# Patient Record
Sex: MALE | Race: WHITE | NOT HISPANIC OR LATINO | URBAN - METROPOLITAN AREA
[De-identification: names, ages, dates, MRNs, and addresses within clinical notes are randomized per-mention and may not be internally consistent; named-entity substitution may affect disease eponyms.]

---

## 2021-12-29 ENCOUNTER — OUTPATIENT (OUTPATIENT)
Dept: OUTPATIENT SERVICES | Facility: HOSPITAL | Age: 41
LOS: 1 days | End: 2021-12-29
Payer: COMMERCIAL

## 2021-12-29 DIAGNOSIS — Z20.828 CONTACT WITH AND (SUSPECTED) EXPOSURE TO OTHER VIRAL COMMUNICABLE DISEASES: ICD-10-CM

## 2021-12-29 LAB — SARS-COV-2 RNA SPEC QL NAA+PROBE: SIGNIFICANT CHANGE UP

## 2021-12-29 PROCEDURE — C9803: CPT

## 2021-12-29 PROCEDURE — 87635 SARS-COV-2 COVID-19 AMP PRB: CPT

## 2021-12-30 DIAGNOSIS — Z20.828 CONTACT WITH AND (SUSPECTED) EXPOSURE TO OTHER VIRAL COMMUNICABLE DISEASES: ICD-10-CM

## 2023-05-09 ENCOUNTER — INPATIENT (INPATIENT)
Facility: HOSPITAL | Age: 43
LOS: 1 days | Discharge: HOME | DRG: 603 | End: 2023-05-11
Attending: INTERNAL MEDICINE | Admitting: INTERNAL MEDICINE
Payer: COMMERCIAL

## 2023-05-09 VITALS — HEIGHT: 76 IN | WEIGHT: 250 LBS

## 2023-05-09 DIAGNOSIS — I89.1 LYMPHANGITIS: ICD-10-CM

## 2023-05-09 LAB
ALBUMIN SERPL ELPH-MCNC: 3.6 G/DL — SIGNIFICANT CHANGE UP (ref 3.3–5)
ALP SERPL-CCNC: 84 U/L — SIGNIFICANT CHANGE UP (ref 40–120)
ALT FLD-CCNC: 18 U/L — SIGNIFICANT CHANGE UP (ref 12–78)
ANION GAP SERPL CALC-SCNC: 6 MMOL/L — SIGNIFICANT CHANGE UP (ref 5–17)
APPEARANCE UR: ABNORMAL
APTT BLD: 32.6 SEC — SIGNIFICANT CHANGE UP (ref 27.5–35.5)
AST SERPL-CCNC: 15 U/L — SIGNIFICANT CHANGE UP (ref 15–37)
B BURGDOR C6 AB SER-ACNC: NEGATIVE — SIGNIFICANT CHANGE UP
B BURGDOR IGG+IGM SER QL IB: SIGNIFICANT CHANGE UP
B BURGDOR IGG+IGM SER-ACNC: 0.45 INDEX — SIGNIFICANT CHANGE UP (ref 0.01–0.89)
BABESIA MICROTI PCR, BLD RESULT: SIGNIFICANT CHANGE UP
BACTERIA # UR AUTO: ABNORMAL
BASOPHILS # BLD AUTO: 0.03 K/UL — SIGNIFICANT CHANGE UP (ref 0–0.2)
BASOPHILS NFR BLD AUTO: 0.2 % — SIGNIFICANT CHANGE UP (ref 0–2)
BILIRUB SERPL-MCNC: 1.1 MG/DL — SIGNIFICANT CHANGE UP (ref 0.2–1.2)
BILIRUB UR-MCNC: ABNORMAL
BUN SERPL-MCNC: 15 MG/DL — SIGNIFICANT CHANGE UP (ref 7–23)
CALCIUM SERPL-MCNC: 9.1 MG/DL — SIGNIFICANT CHANGE UP (ref 8.5–10.1)
CHLORIDE SERPL-SCNC: 102 MMOL/L — SIGNIFICANT CHANGE UP (ref 96–108)
CO2 SERPL-SCNC: 27 MMOL/L — SIGNIFICANT CHANGE UP (ref 22–31)
COLOR SPEC: YELLOW — SIGNIFICANT CHANGE UP
COMMENT - URINE: SIGNIFICANT CHANGE UP
CREAT SERPL-MCNC: 1.33 MG/DL — HIGH (ref 0.5–1.3)
CRP SERPL-MCNC: 139 MG/L — HIGH
DIFF PNL FLD: ABNORMAL
EGFR: 68 ML/MIN/1.73M2 — SIGNIFICANT CHANGE UP
EOSINOPHIL # BLD AUTO: 0 K/UL — SIGNIFICANT CHANGE UP (ref 0–0.5)
EOSINOPHIL NFR BLD AUTO: 0 % — SIGNIFICANT CHANGE UP (ref 0–6)
EPI CELLS # UR: NEGATIVE — SIGNIFICANT CHANGE UP
ERYTHROCYTE [SEDIMENTATION RATE] IN BLOOD: 56 MM/HR — HIGH (ref 0–15)
GLUCOSE SERPL-MCNC: 112 MG/DL — HIGH (ref 70–99)
GLUCOSE UR QL: NEGATIVE — SIGNIFICANT CHANGE UP
HCT VFR BLD CALC: 42.9 % — SIGNIFICANT CHANGE UP (ref 39–50)
HGB BLD-MCNC: 14.9 G/DL — SIGNIFICANT CHANGE UP (ref 13–17)
HYALINE CASTS # UR AUTO: ABNORMAL /LPF
IMM GRANULOCYTES NFR BLD AUTO: 0.8 % — SIGNIFICANT CHANGE UP (ref 0–0.9)
INR BLD: 1.23 RATIO — HIGH (ref 0.88–1.16)
KETONES UR-MCNC: NEGATIVE — SIGNIFICANT CHANGE UP
LACTATE SERPL-SCNC: 0.7 MMOL/L — SIGNIFICANT CHANGE UP (ref 0.7–2)
LACTATE SERPL-SCNC: 2.2 MMOL/L — HIGH (ref 0.7–2)
LEUKOCYTE ESTERASE UR-ACNC: ABNORMAL
LYMPHOCYTES # BLD AUTO: 1.16 K/UL — SIGNIFICANT CHANGE UP (ref 1–3.3)
LYMPHOCYTES # BLD AUTO: 8.5 % — LOW (ref 13–44)
MCHC RBC-ENTMCNC: 30.3 PG — SIGNIFICANT CHANGE UP (ref 27–34)
MCHC RBC-ENTMCNC: 34.7 GM/DL — SIGNIFICANT CHANGE UP (ref 32–36)
MCV RBC AUTO: 87.4 FL — SIGNIFICANT CHANGE UP (ref 80–100)
MONOCYTES # BLD AUTO: 1.03 K/UL — HIGH (ref 0–0.9)
MONOCYTES NFR BLD AUTO: 7.6 % — SIGNIFICANT CHANGE UP (ref 2–14)
NEUTROPHILS # BLD AUTO: 11.27 K/UL — HIGH (ref 1.8–7.4)
NEUTROPHILS NFR BLD AUTO: 82.9 % — HIGH (ref 43–77)
NITRITE UR-MCNC: NEGATIVE — SIGNIFICANT CHANGE UP
PH UR: 5 — SIGNIFICANT CHANGE UP (ref 5–8)
PLATELET # BLD AUTO: 259 K/UL — SIGNIFICANT CHANGE UP (ref 150–400)
POTASSIUM SERPL-MCNC: 4.3 MMOL/L — SIGNIFICANT CHANGE UP (ref 3.5–5.3)
POTASSIUM SERPL-SCNC: 4.3 MMOL/L — SIGNIFICANT CHANGE UP (ref 3.5–5.3)
PROT SERPL-MCNC: 8.3 GM/DL — SIGNIFICANT CHANGE UP (ref 6–8.3)
PROT UR-MCNC: SIGNIFICANT CHANGE UP MG/DL
PROTHROM AB SERPL-ACNC: 14.3 SEC — HIGH (ref 10.5–13.4)
RAPID RVP RESULT: SIGNIFICANT CHANGE UP
RBC # BLD: 4.91 M/UL — SIGNIFICANT CHANGE UP (ref 4.2–5.8)
RBC # FLD: 12.1 % — SIGNIFICANT CHANGE UP (ref 10.3–14.5)
RBC CASTS # UR COMP ASSIST: SIGNIFICANT CHANGE UP /HPF (ref 0–4)
SARS-COV-2 RNA SPEC QL NAA+PROBE: SIGNIFICANT CHANGE UP
SODIUM SERPL-SCNC: 135 MMOL/L — SIGNIFICANT CHANGE UP (ref 135–145)
SP GR SPEC: 1.02 — SIGNIFICANT CHANGE UP (ref 1.01–1.02)
UROBILINOGEN FLD QL: 1
WBC # BLD: 13.6 K/UL — HIGH (ref 3.8–10.5)
WBC # FLD AUTO: 13.6 K/UL — HIGH (ref 3.8–10.5)
WBC UR QL: SIGNIFICANT CHANGE UP /HPF (ref 0–5)

## 2023-05-09 PROCEDURE — 80202 ASSAY OF VANCOMYCIN: CPT

## 2023-05-09 PROCEDURE — 73590 X-RAY EXAM OF LOWER LEG: CPT | Mod: 26,LT

## 2023-05-09 PROCEDURE — 99285 EMERGENCY DEPT VISIT HI MDM: CPT

## 2023-05-09 PROCEDURE — 85027 COMPLETE CBC AUTOMATED: CPT

## 2023-05-09 PROCEDURE — 71045 X-RAY EXAM CHEST 1 VIEW: CPT | Mod: 26

## 2023-05-09 PROCEDURE — 93971 EXTREMITY STUDY: CPT | Mod: LT

## 2023-05-09 PROCEDURE — 83605 ASSAY OF LACTIC ACID: CPT

## 2023-05-09 PROCEDURE — 80048 BASIC METABOLIC PNL TOTAL CA: CPT

## 2023-05-09 PROCEDURE — 99223 1ST HOSP IP/OBS HIGH 75: CPT

## 2023-05-09 PROCEDURE — 36415 COLL VENOUS BLD VENIPUNCTURE: CPT

## 2023-05-09 RX ORDER — VANCOMYCIN HCL 1 G
VIAL (EA) INTRAVENOUS
Refills: 0 | Status: COMPLETED | OUTPATIENT
Start: 2023-05-09 | End: 2023-05-10

## 2023-05-09 RX ORDER — LANOLIN ALCOHOL/MO/W.PET/CERES
3 CREAM (GRAM) TOPICAL AT BEDTIME
Refills: 0 | Status: DISCONTINUED | OUTPATIENT
Start: 2023-05-09 | End: 2023-05-11

## 2023-05-09 RX ORDER — ONDANSETRON 8 MG/1
4 TABLET, FILM COATED ORAL EVERY 8 HOURS
Refills: 0 | Status: DISCONTINUED | OUTPATIENT
Start: 2023-05-09 | End: 2023-05-11

## 2023-05-09 RX ORDER — VANCOMYCIN HCL 1 G
1750 VIAL (EA) INTRAVENOUS EVERY 12 HOURS
Refills: 0 | Status: COMPLETED | OUTPATIENT
Start: 2023-05-09 | End: 2023-05-09

## 2023-05-09 RX ORDER — CEFTRIAXONE 500 MG/1
1000 INJECTION, POWDER, FOR SOLUTION INTRAMUSCULAR; INTRAVENOUS ONCE
Refills: 0 | Status: DISCONTINUED | OUTPATIENT
Start: 2023-05-09 | End: 2023-05-09

## 2023-05-09 RX ORDER — ACETAMINOPHEN 500 MG
650 TABLET ORAL EVERY 6 HOURS
Refills: 0 | Status: DISCONTINUED | OUTPATIENT
Start: 2023-05-09 | End: 2023-05-11

## 2023-05-09 RX ORDER — CEFTRIAXONE 500 MG/1
1000 INJECTION, POWDER, FOR SOLUTION INTRAMUSCULAR; INTRAVENOUS EVERY 24 HOURS
Refills: 0 | Status: DISCONTINUED | OUTPATIENT
Start: 2023-05-10 | End: 2023-05-10

## 2023-05-09 RX ORDER — VANCOMYCIN HCL 1 G
1750 VIAL (EA) INTRAVENOUS ONCE
Refills: 0 | Status: COMPLETED | OUTPATIENT
Start: 2023-05-09 | End: 2023-05-09

## 2023-05-09 RX ORDER — SEMAGLUTIDE 0.68 MG/ML
0.25 INJECTION, SOLUTION SUBCUTANEOUS
Refills: 0 | DISCHARGE

## 2023-05-09 RX ORDER — SODIUM CHLORIDE 9 MG/ML
2700 INJECTION INTRAMUSCULAR; INTRAVENOUS; SUBCUTANEOUS ONCE
Refills: 0 | Status: COMPLETED | OUTPATIENT
Start: 2023-05-09 | End: 2023-05-09

## 2023-05-09 RX ADMIN — SODIUM CHLORIDE 2700 MILLILITER(S): 9 INJECTION INTRAMUSCULAR; INTRAVENOUS; SUBCUTANEOUS at 11:34

## 2023-05-09 RX ADMIN — Medication 250 MILLIGRAM(S): at 23:44

## 2023-05-09 RX ADMIN — Medication 250 MILLIGRAM(S): at 12:20

## 2023-05-09 RX ADMIN — CEFTRIAXONE 1000 MILLIGRAM(S): 500 INJECTION, POWDER, FOR SOLUTION INTRAMUSCULAR; INTRAVENOUS at 23:43

## 2023-05-09 RX ADMIN — Medication 650 MILLIGRAM(S): at 18:14

## 2023-05-09 RX ADMIN — Medication 650 MILLIGRAM(S): at 17:19

## 2023-05-09 NOTE — ED STATDOCS - CARE PLAN
Principal Discharge DX:	Lymphangitis of lower extremity  Secondary Diagnosis:	Cellulitis with lymphangitis   1

## 2023-05-09 NOTE — ED STATDOCS - CONSIDERATION OF ADMISSION OBSERVATION
Consideration of Admission/Observation Pt w/ systemic symptoms of chills, rigors at home, also with lymphangitic spread of infection on exam. Pt will need IV abx.  Plan: admit

## 2023-05-09 NOTE — ED ADULT TRIAGE NOTE - CHIEF COMPLAINT QUOTE
Pt presented to the ER with c/o left leg infection. Pt stated that he noticed that it started Sunday around his ankle and has been spreading up his leg. Pt went to urgent care who referred him to come the ER. Pt stated that it hurts to bear weight. Pt reported a fever, nausea, and headache.

## 2023-05-09 NOTE — PATIENT PROFILE ADULT - FALL HARM RISK - HARM RISK INTERVENTIONS

## 2023-05-09 NOTE — H&P ADULT - NSHPPHYSICALEXAM_GEN_ALL_CORE
Vital Signs Last 24 Hrs  T(C): 37.8 (09 May 2023 09:35), Max: 37.8 (09 May 2023 09:35)  T(F): 100 (09 May 2023 09:35), Max: 100 (09 May 2023 09:35)  HR: 100 (09 May 2023 09:35) (100 - 100)  BP: 108/62 (09 May 2023 09:35) (108/62 - 108/62)  BP(mean): 78 (09 May 2023 09:35) (78 - 78)  RR: 18 (09 May 2023 09:35) (18 - 18)  SpO2: 97% (09 May 2023 09:35) (97% - 97%)    Parameters below as of 09 May 2023 09:35  Patient On (Oxygen Delivery Method): room air    PHYSICAL EXAM:      Constitutional: NAD  Eyes: perrl, no conjunctival changes  ENMT: no exudates, moist oral muc, uvula midline  Neck: no JVD, no LAD  Back: no cva tenderness  Respiratory: CTA, no exp wheezes  Cardiovascular: S1S2 reg, no murmur gallop or rub  Gastrointestinal: abd soft, NT/ND + BS  Genitourinary: voiding  Extremities: LLE marked edema and erythema, lymphangitic spread to inguinal area, tender to palpation   Vascular: pedal pulses + bilateral, warm extremities  Neurological: non focal, mot str 5/5/ all extr

## 2023-05-09 NOTE — H&P ADULT - HISTORY OF PRESENT ILLNESS
Statement: 42 y/o male w/ no pertinent PMHx presents to the ED SIB  c/o redness on his L shin with associated chills. Pt tested -COVID/-Flu at  today. Pt endorses red streak starting from the area of redness and tracking up his left leg that is new. Pt states he was told he has an infection, endorses a wound on his heel that he thinks started from new shoes or a possible spider bite. He has lymphangitic spread to inguinal area. Adm for severe cellulitis. He is also c/o chills and poor appetite r/o bacteremia.

## 2023-05-09 NOTE — ED STATDOCS - SKIN, MLM
+LLE distal third of LE, streaking redness on upper third of LLE, old injury on heel +LLE distal third of LE, streaking redness on upper third of LLE, old abrasion heel scabbed over and no redness

## 2023-05-09 NOTE — H&P ADULT - ASSESSMENT
* Severe cellulitis LLE  IVF  IV Ceftriaxone +/- Vanco- ID consult  prn analgesia  blood cx sent      Plan LMWH after BMP available for DVT px

## 2023-05-09 NOTE — ED ADULT NURSE NOTE - CAS EDN DISCHARGE INTERVENTIONS
----- Message from Mignon Montoya sent at 7/17/2017  8:32 AM CDT -----  Contact: Emma Vela mom [phone]  Needs rx refill adderall xr 15 mg, [facility] on [place], Dr Varghese writes the child's rx  
IV intact/Admission wristband placed

## 2023-05-09 NOTE — ED STATDOCS - OBJECTIVE STATEMENT
42 y/o male w/ no pertinent PMHx presents to the ED SIB  c/o redness on his L shin with associated chills. Pt tested -COVID/-Flu at  today. Pt endorses red streak starting from the area of redness and tracking up his left leg that is new. Pt states he was told he has an infection, endorses a wound on his heel that he thinks started from new shoes or a possible spider bite. Pt is allergic to ASA.

## 2023-05-09 NOTE — ED STATDOCS - PROGRESS NOTE DETAILS
42 y/o Male with no PMHx presents to the ED c/o increasing redness and swelling to left leg.  Associated with chills, rigors at home.  Went to  this morning and was referred to ED for further treatment with IV abx.  On exam, left lower leg with erythema to ankle and shin area with streaking extending toward knee.  Will f/u xray to r.o Gas producing infection.  Plan to admit to hospital.  Discussed case with Dr. Osorio for admission.  Cindy Chaves PA-C

## 2023-05-09 NOTE — ED STATDOCS - ATTENDING APP SHARED VISIT CONTRIBUTION OF CARE
I, John Garner MD,  performed the initial face to face bedside interview with this patient regarding history of present illness, review of symptoms and relevant past medical, social and family history.  I completed an independent physical examination.  I was the initial provider who evaluated this patient.   I personally saw the patient and performed a substantive portion of the visit including all aspects of the medical decision making.  I have signed out the follow up of any pending tests (i.e. labs, radiological studies) to the WILLIAM.  I have communicated the patient’s plan of care and disposition with the WILLIAM.  The history, relevant review of systems, past medical and surgical history, medical decision making, and physical examination was documented by the scribe in my presence and I attest to the accuracy of the documentation.

## 2023-05-09 NOTE — PATIENT PROFILE ADULT - FALL HARM RISK - FACTORS NURSING JUDGEMENT
Requested Prescriptions     Pending Prescriptions Disp Refills    apixaban (ELIQUIS) 5 mg tablet 180 Tab 3     Sig: Take 1 Tab by mouth two (2) times a day.  atorvastatin (LIPITOR) 20 mg tablet 90 Tab 3     Sig: Take 1 Tab by mouth daily.  amiodarone (CORDARONE) 200 mg tablet 90 Tab 3     Sig: Take 1 Tab by mouth daily. Yes

## 2023-05-10 LAB
ANION GAP SERPL CALC-SCNC: 5 MMOL/L — SIGNIFICANT CHANGE UP (ref 5–17)
BUN SERPL-MCNC: 9 MG/DL — SIGNIFICANT CHANGE UP (ref 7–23)
CALCIUM SERPL-MCNC: 8.4 MG/DL — LOW (ref 8.5–10.1)
CHLORIDE SERPL-SCNC: 106 MMOL/L — SIGNIFICANT CHANGE UP (ref 96–108)
CO2 SERPL-SCNC: 24 MMOL/L — SIGNIFICANT CHANGE UP (ref 22–31)
CREAT SERPL-MCNC: 0.9 MG/DL — SIGNIFICANT CHANGE UP (ref 0.5–1.3)
CULTURE RESULTS: SIGNIFICANT CHANGE UP
EGFR: 109 ML/MIN/1.73M2 — SIGNIFICANT CHANGE UP
GLUCOSE SERPL-MCNC: 107 MG/DL — HIGH (ref 70–99)
HCT VFR BLD CALC: 36.7 % — LOW (ref 39–50)
HGB BLD-MCNC: 12.7 G/DL — LOW (ref 13–17)
MCHC RBC-ENTMCNC: 30.2 PG — SIGNIFICANT CHANGE UP (ref 27–34)
MCHC RBC-ENTMCNC: 34.6 GM/DL — SIGNIFICANT CHANGE UP (ref 32–36)
MCV RBC AUTO: 87.4 FL — SIGNIFICANT CHANGE UP (ref 80–100)
PLATELET # BLD AUTO: 218 K/UL — SIGNIFICANT CHANGE UP (ref 150–400)
POTASSIUM SERPL-MCNC: 4.2 MMOL/L — SIGNIFICANT CHANGE UP (ref 3.5–5.3)
POTASSIUM SERPL-SCNC: 4.2 MMOL/L — SIGNIFICANT CHANGE UP (ref 3.5–5.3)
RBC # BLD: 4.2 M/UL — SIGNIFICANT CHANGE UP (ref 4.2–5.8)
RBC # FLD: 12.2 % — SIGNIFICANT CHANGE UP (ref 10.3–14.5)
SODIUM SERPL-SCNC: 135 MMOL/L — SIGNIFICANT CHANGE UP (ref 135–145)
SPECIMEN SOURCE: SIGNIFICANT CHANGE UP
WBC # BLD: 9.02 K/UL — SIGNIFICANT CHANGE UP (ref 3.8–10.5)
WBC # FLD AUTO: 9.02 K/UL — SIGNIFICANT CHANGE UP (ref 3.8–10.5)

## 2023-05-10 PROCEDURE — 93971 EXTREMITY STUDY: CPT | Mod: 26,LT

## 2023-05-10 PROCEDURE — 99233 SBSQ HOSP IP/OBS HIGH 50: CPT

## 2023-05-10 RX ORDER — CEFEPIME 1 G/1
INJECTION, POWDER, FOR SOLUTION INTRAMUSCULAR; INTRAVENOUS
Refills: 0 | Status: DISCONTINUED | OUTPATIENT
Start: 2023-05-10 | End: 2023-05-11

## 2023-05-10 RX ORDER — CEFEPIME 1 G/1
INJECTION, POWDER, FOR SOLUTION INTRAMUSCULAR; INTRAVENOUS
Refills: 0 | Status: DISCONTINUED | OUTPATIENT
Start: 2023-05-10 | End: 2023-05-10

## 2023-05-10 RX ORDER — CEFEPIME 1 G/1
2000 INJECTION, POWDER, FOR SOLUTION INTRAMUSCULAR; INTRAVENOUS EVERY 12 HOURS
Refills: 0 | Status: DISCONTINUED | OUTPATIENT
Start: 2023-05-10 | End: 2023-05-11

## 2023-05-10 RX ORDER — VANCOMYCIN HCL 1 G
1500 VIAL (EA) INTRAVENOUS EVERY 12 HOURS
Refills: 0 | Status: DISCONTINUED | OUTPATIENT
Start: 2023-05-10 | End: 2023-05-11

## 2023-05-10 RX ORDER — CEFEPIME 1 G/1
2000 INJECTION, POWDER, FOR SOLUTION INTRAMUSCULAR; INTRAVENOUS ONCE
Refills: 0 | Status: COMPLETED | OUTPATIENT
Start: 2023-05-10 | End: 2023-05-10

## 2023-05-10 RX ORDER — ENOXAPARIN SODIUM 100 MG/ML
40 INJECTION SUBCUTANEOUS EVERY 24 HOURS
Refills: 0 | Status: DISCONTINUED | OUTPATIENT
Start: 2023-05-10 | End: 2023-05-11

## 2023-05-10 RX ADMIN — ENOXAPARIN SODIUM 40 MILLIGRAM(S): 100 INJECTION SUBCUTANEOUS at 14:48

## 2023-05-10 RX ADMIN — CEFEPIME 100 MILLIGRAM(S): 1 INJECTION, POWDER, FOR SOLUTION INTRAMUSCULAR; INTRAVENOUS at 14:48

## 2023-05-10 RX ADMIN — Medication 300 MILLIGRAM(S): at 11:57

## 2023-05-10 RX ADMIN — Medication 650 MILLIGRAM(S): at 19:20

## 2023-05-10 RX ADMIN — Medication 650 MILLIGRAM(S): at 18:51

## 2023-05-10 RX ADMIN — Medication 300 MILLIGRAM(S): at 21:50

## 2023-05-10 NOTE — PROGRESS NOTE ADULT - SUBJECTIVE AND OBJECTIVE BOX
44 y/o male w/ no pertinent PMHx admitted for cellulitis     Pt reports he's able to bear weight on his leg today. Lateral aspect of the ankle looks better per patient.   Anxious about blood culture results and plan of care.   All questions answered.     Review of Systems:  Other Review of Systems: All other review of systems negative, except as noted in HPI      Allergies and Intolerances:        Allergies:  	aspirin: Drug, Hives  	Tree Nuts: Food, Hives        Patient History:   Past Medical, Past Surgical, and Family History:  PAST MEDICAL HISTORY:  No pertinent past medical history.    MEDICATIONS  (STANDING):  cefepime  Injectable.      vancomycin  IVPB 1500 milliGRAM(s) IV Intermittent every 12 hours    MEDICATIONS  (PRN):  acetaminophen     Tablet .. 650 milliGRAM(s) Oral every 6 hours PRN Temp greater or equal to 38C (100.4F), Mild Pain (1 - 3)  aluminum hydroxide/magnesium hydroxide/simethicone Suspension 30 milliLiter(s) Oral every 4 hours PRN Dyspepsia  melatonin 3 milliGRAM(s) Oral at bedtime PRN Insomnia  ondansetron Injectable 4 milliGRAM(s) IV Push every 8 hours PRN Nausea and/or Vomiting        Physical Exam:   Vital Signs Last 24 Hrs  T(C): 36.8 (10 May 2023 08:33), Max: 38 (09 May 2023 17:18)  T(F): 98.2 (10 May 2023 08:33), Max: 100.4 (09 May 2023 17:18)  HR: 86 (10 May 2023 08:33) (85 - 97)  BP: 119/83 (10 May 2023 08:33) (112/71 - 137/72)  BP(mean): 87 (09 May 2023 21:40) (87 - 87)  RR: 18 (10 May 2023 08:33) (15 - 18)  SpO2: 97% (10 May 2023 08:33) (97% - 100%)    Parameters below as of 10 May 2023 08:33  Patient On (Oxygen Delivery Method): room air        PHYSICAL EXAM:    Constitutional: NAD  Eyes: perrl, no conjunctival changes  ENMT: no exudates, moist oral muc, uvula midline  Neck: no JVD, no LAD  Back: no cva tenderness  Respiratory: CTA, no exp wheezes  Cardiovascular: S1S2 reg, no murmur gallop or rub  Gastrointestinal: abd soft, NT/ND + BS  Genitourinary: voiding  Extremities: LLE marked edema and erythema, lymphangitic spread to inguinal area, minimal tenderness to palpation  Vascular: pedal pulses + bilateral, warm extremities  Neurological: non focal, mot str 5/5/ all extr      Labs and Results:    CBC Full  -  ( 10 May 2023 06:44 )  WBC Count : 9.02 K/uL  RBC Count : 4.20 M/uL  Hemoglobin : 12.7 g/dL  Hematocrit : 36.7 %  Platelet Count - Automated : 218 K/uL  Mean Cell Volume : 87.4 fl  Mean Cell Hemoglobin : 30.2 pg  Mean Cell Hemoglobin Concentration : 34.6 gm/dL  Auto Neutrophil # : x  Auto Lymphocyte # : x  Auto Monocyte # : x  Auto Eosinophil # : x  Auto Basophil # : x  Auto Neutrophil % : x  Auto Lymphocyte % : x  Auto Monocyte % : x  Auto Eosinophil % : x  Auto Basophil % : x    05-10    135  |  106  |  9   ----------------------------<  107<H>  4.2   |  24  |  0.90    Ca    8.4<L>      10 May 2023 06:44    TPro  8.3  /  Alb  3.6  /  TBili  1.1  /  DBili  x   /  AST  15  /  ALT  18  /  AlkPhos  84  05-09

## 2023-05-10 NOTE — CONSULT NOTE ADULT - SUBJECTIVE AND OBJECTIVE BOX
Patient is a 43y old  Male who presents with a chief complaint of LLE redness (09 May 2023 11:57)    HPI:  44 y/o male w/ no pertinent PMHx presents to the ED SIB  c/o redness on his L shin with associated chills. Pt tested -COVID/-Flu at  today. Pt endorses red streak starting from the area of redness and tracking up his left leg that is new. Pt states he was told he has an infection, endorses a wound on his heel that he thinks started from new shoes or a possible spider bite. He has lymphangitic spread to inguinal area. Adm for severe cellulitis. He is also c/o chills and poor appetite r/o bacteremia. Was given IV vancomycin/rocephin.       PMH: as above  PSH: as above  Meds: per reconciliation sheet, noted below  MEDICATIONS  (STANDING):  cefepime  Injectable.      vancomycin  IVPB 1500 milliGRAM(s) IV Intermittent every 12 hours    Allergies    Tree Nuts (Hives)  aspirin (Hives)    Intolerances      Social: no smoking, no alcohol, no illegal drugs; no recent travel, no exposure to TB  FAMILY HISTORY:     no history of premature cardiovascular disease in first degree relatives    ROS: the patient denies fever, no chills, no HA, no dizziness, no sore throat, no blurry vision, no CP, no palpitations, no SOB, no cough, no abdominal pain, no diarrhea, no N/V, no dysuria, no leg pain, no claudication, no rash, no joint aches, no rectal pain or bleeding, no night sweats + LLE pain, redness, swelling    All other systems reviewed and are negative    Vital Signs Last 24 Hrs  T(C): 36.8 (10 May 2023 08:33), Max: 38 (09 May 2023 17:18)  T(F): 98.2 (10 May 2023 08:33), Max: 100.4 (09 May 2023 17:18)  HR: 86 (10 May 2023 08:33) (85 - 97)  BP: 119/83 (10 May 2023 08:33) (112/71 - 137/72)  BP(mean): 87 (09 May 2023 21:40) (87 - 87)  RR: 18 (10 May 2023 08:33) (15 - 18)  SpO2: 97% (10 May 2023 08:33) (97% - 100%)    Parameters below as of 10 May 2023 08:33  Patient On (Oxygen Delivery Method): room air      PE:  Constitutional: NAD   HEENT: NC/AT, EOMI, PERRLA, conjunctivae clear; ears and nose atraumatic; pharynx benign  Neck: supple; thyroid not palpable  Back: no tenderness  Respiratory: respiratory effort normal; clear to auscultation  Cardiovascular: S1S2 regular, no murmurs  Abdomen: soft, not tender, not distended, positive BS; liver and spleen WNL  Genitourinary: no suprapubic tenderness  Lymphatic: no LN palpable  Musculoskeletal: no muscle tenderness, no joint swelling or tenderness  Extremities: no pedal edema  Neurological/ Psychiatric: AxOx3, Judgement and insight normal;  moving all extremities  Skin: no rashes; no palpable lesions + LLE erythema, warmth, tenderness     Labs: all available labs reviewed                        12.7   9.02  )-----------( 218      ( 10 May 2023 06:44 )             36.7     05-10    135  |  106  |  9   ----------------------------<  107<H>  4.2   |  24  |  0.90    Ca    8.4<L>      10 May 2023 06:44    TPro  8.3  /  Alb  3.6  /  TBili  1.1  /  DBili  x   /  AST  15  /  ALT  18  /  AlkPhos  84  05-09     LIVER FUNCTIONS - ( 09 May 2023 11:25 )  Alb: 3.6 g/dL / Pro: 8.3 gm/dL / ALK PHOS: 84 U/L / ALT: 18 U/L / AST: 15 U/L / GGT: x           Urinalysis Basic - ( 09 May 2023 11:25 )    Color: Yellow / Appearance: Slightly Turbid / S.025 / pH: x  Gluc: x / Ketone: Negative  / Bili: Small / Urobili: 1   Blood: x / Protein: Trace mg/dL / Nitrite: Negative   Leuk Esterase: Trace / RBC: 0-2 /HPF / WBC 3-5 /HPF   Sq Epi: x / Non Sq Epi: x / Bacteria: Few          Radiology: all available radiological tests reviewed      ACC: [id number] EXAM:  XR TIB FIB AP LAT 2 VIEWS LT   ORDERED BY: ALEXANDRO LOCK     PROCEDURE DATE:  2023          INTERPRETATION:  2 views of the left lower leg were performed for history   of infection.    There are no prior studies for comparison.    There is no evidence of fracture, dislocation or osteomyelitis. No bone   erosion or destruction is seen. No lytic or blastic bone lesions are   present. There is no sign of penetrating injury or radiopaque foreign   body. There is no soft tissue gas.    IMPRESSION:  1. No evidence of fracture, dislocation or osteomyelitis.  2. No evidence of penetrating injury, opaque foreign body or soft tissue   gas.      Advanced directives addressed: full resuscitation

## 2023-05-10 NOTE — PROGRESS NOTE ADULT - ASSESSMENT
42 y/o male w/ no pertinent PMHx admitted for cellulitis       #Severe cellulitis LLE  IVF  Leucocytosis resolved, clinically improving  Continue Vancomycin, DC rocephin --> cefepime  prn analgesia  f/u blood cx   Tylenol for  fever  discussed care with ID (Dr. Boss) and pt at bedside  all questions answered      Dvtppx: Lovenox

## 2023-05-10 NOTE — CONSULT NOTE ADULT - ASSESSMENT
42 y/o male w/ no pertinent PMHx presents to the ED SIB  c/o redness on his L shin with associated chills. Pt tested -COVID/-Flu at  today. Pt endorses red streak starting from the area of redness and tracking up his left leg that is new. Pt states he was told he has an infection, endorses a wound on his heel that he thinks started from new shoes or a possible spider bite. He has lymphangitic spread to inguinal area. Adm for severe cellulitis. He is also c/o chills and poor appetite r/o bacteremia. Was given IV vancomycin/rocephin.     1. LLE cellulitis with lymphangitis  - change rocephin to cefepime [id number]  - continue with vancomycin [id number] check trough prior to 4th dose  - f/u cultures   - monitor temps  - tolerating abx well so far; no side effects noted  - reason for abx use and side effects reviewed with patient  - supportive care  - fu cbc    2. other issues - care per medicine

## 2023-05-11 ENCOUNTER — TRANSCRIPTION ENCOUNTER (OUTPATIENT)
Age: 43
End: 2023-05-11

## 2023-05-11 VITALS
RESPIRATION RATE: 19 BRPM | SYSTOLIC BLOOD PRESSURE: 117 MMHG | HEART RATE: 75 BPM | DIASTOLIC BLOOD PRESSURE: 78 MMHG | OXYGEN SATURATION: 97 % | TEMPERATURE: 97 F

## 2023-05-11 PROBLEM — Z00.00 ENCOUNTER FOR PREVENTIVE HEALTH EXAMINATION: Status: ACTIVE | Noted: 2023-05-11

## 2023-05-11 LAB
ANION GAP SERPL CALC-SCNC: 5 MMOL/L — SIGNIFICANT CHANGE UP (ref 5–17)
BUN SERPL-MCNC: 8 MG/DL — SIGNIFICANT CHANGE UP (ref 7–23)
CALCIUM SERPL-MCNC: 8.9 MG/DL — SIGNIFICANT CHANGE UP (ref 8.5–10.1)
CHLORIDE SERPL-SCNC: 104 MMOL/L — SIGNIFICANT CHANGE UP (ref 96–108)
CO2 SERPL-SCNC: 28 MMOL/L — SIGNIFICANT CHANGE UP (ref 22–31)
CREAT SERPL-MCNC: 0.88 MG/DL — SIGNIFICANT CHANGE UP (ref 0.5–1.3)
EGFR: 110 ML/MIN/1.73M2 — SIGNIFICANT CHANGE UP
GLUCOSE SERPL-MCNC: 108 MG/DL — HIGH (ref 70–99)
HCT VFR BLD CALC: 38.1 % — LOW (ref 39–50)
HGB BLD-MCNC: 12.8 G/DL — LOW (ref 13–17)
MCHC RBC-ENTMCNC: 29.7 PG — SIGNIFICANT CHANGE UP (ref 27–34)
MCHC RBC-ENTMCNC: 33.6 GM/DL — SIGNIFICANT CHANGE UP (ref 32–36)
MCV RBC AUTO: 88.4 FL — SIGNIFICANT CHANGE UP (ref 80–100)
PLATELET # BLD AUTO: 248 K/UL — SIGNIFICANT CHANGE UP (ref 150–400)
POTASSIUM SERPL-MCNC: 4.4 MMOL/L — SIGNIFICANT CHANGE UP (ref 3.5–5.3)
POTASSIUM SERPL-SCNC: 4.4 MMOL/L — SIGNIFICANT CHANGE UP (ref 3.5–5.3)
RBC # BLD: 4.31 M/UL — SIGNIFICANT CHANGE UP (ref 4.2–5.8)
RBC # FLD: 12.1 % — SIGNIFICANT CHANGE UP (ref 10.3–14.5)
SODIUM SERPL-SCNC: 137 MMOL/L — SIGNIFICANT CHANGE UP (ref 135–145)
VANCOMYCIN TROUGH SERPL-MCNC: 11.5 UG/ML — SIGNIFICANT CHANGE UP (ref 10–20)
WBC # BLD: 9.49 K/UL — SIGNIFICANT CHANGE UP (ref 3.8–10.5)
WBC # FLD AUTO: 9.49 K/UL — SIGNIFICANT CHANGE UP (ref 3.8–10.5)

## 2023-05-11 PROCEDURE — 99239 HOSP IP/OBS DSCHRG MGMT >30: CPT

## 2023-05-11 RX ORDER — CEFUROXIME AXETIL 250 MG
1 TABLET ORAL
Qty: 20 | Refills: 0
Start: 2023-05-11 | End: 2023-05-20

## 2023-05-11 RX ADMIN — CEFEPIME 100 MILLIGRAM(S): 1 INJECTION, POWDER, FOR SOLUTION INTRAMUSCULAR; INTRAVENOUS at 02:22

## 2023-05-11 RX ADMIN — CEFEPIME 100 MILLIGRAM(S): 1 INJECTION, POWDER, FOR SOLUTION INTRAMUSCULAR; INTRAVENOUS at 14:14

## 2023-05-11 RX ADMIN — Medication 300 MILLIGRAM(S): at 09:30

## 2023-05-11 NOTE — PROGRESS NOTE ADULT - ASSESSMENT
42 y/o male w/ no pertinent PMHx presents to the ED SIB  c/o redness on his L shin with associated chills. Pt tested -COVID/-Flu at . Pt endorses red streak starting from the area of redness and tracking up his left leg that is new. Pt states he was told he has an infection, endorses a wound on his heel that he thinks started from new shoes or a possible spider bite. He has lymphangitic spread to inguinal area. Adm for severe cellulitis. He is also c/o chills and poor appetite r/o bacteremia. Was given IV vancomycin/rocephin.     1. LLE cellulitis with lymphangitis  - improving  - s/p rocephin, on cefepime [id number] #2  - on vancomycin [id number] check trough prior to 4th dose #2-3  - continue with antibiotic coverage   - blood cx no growth  - on dc - po doxycycline 100mg BID/ceftin 500mg bid x 10 days  - monitor temps  - tolerating abx well so far; no side effects noted  - reason for abx use and side effects reviewed with patient  - supportive care  - fu cbc    2. other issues - care per medicine

## 2023-05-11 NOTE — DISCHARGE NOTE PROVIDER - NSDCCPCAREPLAN_GEN_ALL_CORE_FT
PRINCIPAL DISCHARGE DIAGNOSIS  Diagnosis: Cellulitis with lymphangitis  Assessment and Plan of Treatment: Continue with oral antibiotics as prescribed .  Follow up outpatient with primary care doctor in one week.      SECONDARY DISCHARGE DIAGNOSES  Diagnosis: Cellulitis with lymphangitis  Assessment and Plan of Treatment:

## 2023-05-11 NOTE — DISCHARGE NOTE PROVIDER - HOSPITAL COURSE
Vital Signs Last 24 Hrs  T(C): 36.3 (11 May 2023 07:13), Max: 37 (10 May 2023 15:31)  T(F): 97.3 (11 May 2023 07:13), Max: 98.6 (10 May 2023 15:31)  HR: 75 (11 May 2023 07:13) (74 - 84)  BP: 117/78 (11 May 2023 07:13) (107/68 - 118/74)  BP(mean): --  RR: 19 (11 May 2023 07:13) (18 - 19)  SpO2: 97% (11 May 2023 07:13) (97% - 100%)    Parameters below as of 11 May 2023 07:13  Patient On (Oxygen Delivery Method): room air        HEENT:   pupils equal and reactive, EOMI, no oropharyngeal lesions, erythema, exudates, oral thrush    NECK:   supple, no carotid bruits, no palpable lymph nodes, no thyromegaly    CV:  +S1, +S2, regular, no murmurs or rubs    RESP:   lungs clear to auscultation bilaterally, no wheezing, rales, rhonchi, good air entry bilaterally    BREAST:  not examined    GI:  abdomen soft, non-tender, non-distended, normal BS, no bruits, no abdominal masses, no palpable masses    RECTAL:  not examined    :  not examined    MSK:   normal muscle tone, no atrophy, no rigidity, no contractions    EXT:   no clubbing, no cyanosis, no edema, no calf pain, swelling or erythema    VASCULAR:  pulses equal and symmetric in the upper and lower extremities    NEURO:  AAOX3, no focal neurological deficits, follows all commands, able to move extremities spontaneously    SKIN:  no ulcers, lesions or rashes    11 May 2023 06:06    137    |  104    |  8      ----------------------------<  108    4.4     |  28     |  0.88     Ca    8.9        11 May 2023 06:06    CBC Full  -  ( 11 May 2023 06:06 )  WBC Count : 9.49 K/uL  Hemoglobin : 12.8 g/dL  Hematocrit : 38.1 %  Platelet Count - Automated : 248 K/uL  Mean Cell Volume : 88.4 fl  Mean Cell Hemoglobin : 29.7 pg  Mean Cell Hemoglobin Concentration : 33.6 gm/dL          Hospital Course:     44 y/o male w/ no pertinent PMHx admitted for cellulitis       #Severe cellulitis LLE  IVF  Leucocytosis resolved, clinically improving  Continue Vancomycin, DC rocephin --> cefepime  on dc - po doxycycline 100mg BID/ceftin 500mg bid x 10 days      Dvtppx: Lovenox

## 2023-05-11 NOTE — PROGRESS NOTE ADULT - SUBJECTIVE AND OBJECTIVE BOX
Cheif complaints and Diagnosis: cellulitis LLE    Subjective: no complaints      REVIEW OF SYSTEMS:    CONSTITUTIONAL: No weakness, fevers or chills  EYES/ENT: No visual changes;  No vertigo or throat pain   NECK: No pain or stiffness  RESPIRATORY: No cough, wheezing, hemoptysis; No shortness of breath  CARDIOVASCULAR: No chest pain or palpitations  GASTROINTESTINAL: No abdominal or epigastric pain. No nausea, vomiting, or hematemesis; No diarrhea or constipation. No melena or hematochezia.  GENITOURINARY: No dysuria, frequency or hematuria  NEUROLOGICAL: No numbness or weakness  SKIN: No itching, burning, rashes, or lesions   All other review of systems is negative unless indicated above      Vital Signs Last 24 Hrs  T(C): 36.3 (11 May 2023 07:13), Max: 37 (10 May 2023 15:31)  T(F): 97.3 (11 May 2023 07:13), Max: 98.6 (10 May 2023 15:31)  HR: 75 (11 May 2023 07:13) (74 - 84)  BP: 117/78 (11 May 2023 07:13) (107/68 - 118/74)  BP(mean): --  RR: 19 (11 May 2023 07:13) (18 - 19)  SpO2: 97% (11 May 2023 07:13) (97% - 100%)    Parameters below as of 11 May 2023 07:13  Patient On (Oxygen Delivery Method): room air        HEENT:   pupils equal and reactive, EOMI, no oropharyngeal lesions, erythema, exudates, oral thrush    NECK:   supple, no carotid bruits, no palpable lymph nodes, no thyromegaly    CV:  +S1, +S2, regular, no murmurs or rubs    RESP:   lungs clear to auscultation bilaterally, no wheezing, rales, rhonchi, good air entry bilaterally    BREAST:  not examined    GI:  abdomen soft, non-tender, non-distended, normal BS, no bruits, no abdominal masses, no palpable masses    RECTAL:  not examined    :  not examined    MSK:   normal muscle tone, no atrophy, no rigidity, no contractions    EXT:   no clubbing, no cyanosis, no edema, no calf pain, swelling or erythema    VASCULAR:  pulses equal and symmetric in the upper and lower extremities    NEURO:  AAOX3, no focal neurological deficits, follows all commands, able to move extremities spontaneously    SKIN:  no ulcers, lesions or rashes    MEDICATIONS  (STANDING):  cefepime   IVPB 2000 milliGRAM(s) IV Intermittent every 12 hours  cefepime   IVPB      enoxaparin Injectable 40 milliGRAM(s) SubCutaneous every 24 hours  vancomycin  IVPB 1500 milliGRAM(s) IV Intermittent every 12 hours    MEDICATIONS  (PRN):  acetaminophen     Tablet .. 650 milliGRAM(s) Oral every 6 hours PRN Temp greater or equal to 38C (100.4F), Mild Pain (1 - 3)  aluminum hydroxide/magnesium hydroxide/simethicone Suspension 30 milliLiter(s) Oral every 4 hours PRN Dyspepsia  melatonin 3 milliGRAM(s) Oral at bedtime PRN Insomnia  ondansetron Injectable 4 milliGRAM(s) IV Push every 8 hours PRN Nausea and/or Vomiting      Urinalysis Basic - ( 09 May 2023 11:25 )    Color: Yellow / Appearance: Slightly Turbid / S.025 / pH: x  Gluc: x / Ketone: Negative  / Bili: Small / Urobili: 1   Blood: x / Protein: Trace mg/dL / Nitrite: Negative   Leuk Esterase: Trace / RBC: 0-2 /HPF / WBC 3-5 /HPF   Sq Epi: x / Non Sq Epi: x / Bacteria: Few    11 May 2023 06:06    137    |  104    |  8      ----------------------------<  108    4.4     |  28     |  0.88     Ca    8.9        11 May 2023 06:06    TPro  8.3    /  Alb  3.6    /  TBili  1.1    /  DBili  x      /  AST  15     /  ALT  18     /  AlkPhos  84     09 May 2023 11:25  LIVER FUNCTIONS - ( 09 May 2023 11:25 )  Alb: 3.6 g/dL / Pro: 8.3 gm/dL / ALK PHOS: 84 U/L / ALT: 18 U/L / AST: 15 U/L / GGT: x         PT/INR - ( 09 May 2023 11:25 )   PT: 14.3 sec;   INR: 1.23 ratio         PTT - ( 09 May 2023 11:25 )  PTT:32.6 secCBC Full  -  ( 11 May 2023 06:06 )  WBC Count : 9.49 K/uL  Hemoglobin : 12.8 g/dL  Hematocrit : 38.1 %  Platelet Count - Automated : 248 K/uL  Mean Cell Volume : 88.4 fl  Mean Cell Hemoglobin : 29.7 pg  Mean Cell Hemoglobin Concentration : 33.6 gm/dL  Auto Neutrophil # : x  Auto Lymphocyte # : x  Auto Monocyte # : x  Auto Eosinophil # : x  Auto Basophil # : x  Auto Neutrophil % : x  Auto Lymphocyte % : x  Auto Monocyte % : x  Auto Eosinophil % : x  Auto Basophil % : x            PT/INR - ( 09 May 2023 11:25 )   PT: 14.3 sec;   INR: 1.23 ratio         PTT - ( 09 May 2023 11:25 )  PTT:32.6 sec          Assessment and Plan:      44 y/o male w/ no pertinent PMHx admitted for cellulitis       #Severe cellulitis LLE  IVF  Leucocytosis resolved, clinically improving  Continue Vancomycin, DC rocephin --> cefepime  prn analgesia  f/u blood cx   Tylenol for  fever  discussed care with ID (Dr. Boss) and pt at bedside  all questions answered      Dvtppx: Lovenox

## 2023-05-11 NOTE — DISCHARGE NOTE NURSING/CASE MANAGEMENT/SOCIAL WORK - PATIENT PORTAL LINK FT
You can access the FollowMyHealth Patient Portal offered by [facility] by registering at the following website: [email or web address]. By joining [facility]’s FollowMyHealth portal, you will also be able to view your health information using other applications (apps) compatible with our system.

## 2023-05-11 NOTE — PROGRESS NOTE ADULT - SUBJECTIVE AND OBJECTIVE BOX
Date of service: 23 @ 10:31    pt seen and examined  feels better   less L leg tenderness, redness and swelling are down  no fevers    ROS: no fever or chills; denies dizziness, no HA, no SOB or cough, no abdominal pain, no diarrhea or constipation; no dysuria, no urinary frequency, no legs pain, no rashes    MEDICATIONS  (STANDING):  cefepime   IVPB 2000 milliGRAM(s) IV Intermittent every 12 hours  cefepime   IVPB      enoxaparin Injectable 40 milliGRAM(s) SubCutaneous every 24 hours  vancomycin  IVPB 1500 milliGRAM(s) IV Intermittent every 12 hours      Vital Signs Last 24 Hrs  T(C): 36.3 (11 May 2023 07:13), Max: 37 (10 May 2023 15:31)  T(F): 97.3 (11 May 2023 07:13), Max: 98.6 (10 May 2023 15:31)  HR: 75 (11 May 2023 07:13) (74 - 84)  BP: 117/78 (11 May 2023 07:13) (107/68 - 118/74)  BP(mean): --  RR: 19 (11 May 2023 07:13) (18 - 19)  SpO2: 97% (11 May 2023 07:13) (97% - 100%)    Parameters below as of 11 May 2023 07:13  Patient On (Oxygen Delivery Method): room air        PE:  Constitutional: NAD   HEENT: NC/AT, EOMI, PERRLA, conjunctivae clear; ears and nose atraumatic; pharynx benign  Neck: supple; thyroid not palpable  Back: no tenderness  Respiratory: respiratory effort normal; clear to auscultation  Cardiovascular: S1S2 regular, no murmurs  Abdomen: soft, not tender, not distended, positive BS; liver and spleen WNL  Genitourinary: no suprapubic tenderness  Lymphatic: no LN palpable  Musculoskeletal: no muscle tenderness, no joint swelling or tenderness  Extremities: no pedal edema  Neurological/ Psychiatric: AxOx3, Judgement and insight normal;  moving all extremities  Skin: no rashes; no palpable lesions + LLE resolving erythema, warmth, tenderness     Labs: all available labs reviewed                        12.8   9.49  )-----------( 248      ( 11 May 2023 06:06 )             38.1     05-11    137  |  104  |  8   ----------------------------<  108<H>  4.4   |  28  |  0.88    Ca    8.9      11 May 2023 06:06    TPro  8.3  /  Alb  3.6  /  TBili  1.1  /  DBili  x   /  AST  15  /  ALT  18  /  AlkPhos  84         Vancomycin Level, Trough: 11.5 ug/mL ( @ 06:06)      Urinalysis Basic - ( 09 May 2023 11:25 )    Color: Yellow / Appearance: Slightly Turbid / S.025 / pH: x  Gluc: x / Ketone: Negative  / Bili: Small / Urobili: 1   Blood: x / Protein: Trace mg/dL / Nitrite: Negative   Leuk Esterase: Trace / RBC: 0-2 /HPF / WBC 3-5 /HPF   Sq Epi: x / Non Sq Epi: x / Bacteria: Few    Culture - Urine (23 @ 11:25)   Specimen Source: Clean Catch None  Culture Results:   <10,000 CFU/mL Normal Urogenital Simran  Culture - Blood (23 @ 11:25)   Specimen Source: .Blood None  Culture Results:   No growth to date.  Culture - Blood (23 @ 11:25)   Specimen Source: .Blood None  Culture Results:   No growth to date.    Radiology: all available radiological tests reviewed      ACC: [id number] EXAM:  XR TIB FIB AP LAT 2 VIEWS LT   ORDERED BY: ALEXANDRO LOCK     PROCEDURE DATE:  2023          INTERPRETATION:  2 views of the left lower leg were performed for history   of infection.    There are no prior studies for comparison.    There is no evidence of fracture, dislocation or osteomyelitis. No bone   erosion or destruction is seen. No lytic or blastic bone lesions are   present. There is no sign of penetrating injury or radiopaque foreign   body. There is no soft tissue gas.    IMPRESSION:  1. No evidence of fracture, dislocation or osteomyelitis.  2. No evidence of penetrating injury, opaque foreign body or soft tissue   gas.      Advanced directives addressed: full resuscitation

## 2023-05-11 NOTE — DISCHARGE NOTE PROVIDER - NSDCMRMEDTOKEN_GEN_ALL_CORE_FT
cefuroxime 500 mg oral tablet: 1 tab(s) orally 2 times a day  doxycycline hyclate 100 mg oral capsule: 1 cap(s) orally 2 times a day  semaglutide 0.25 mg/0.5 mL (0.25 mg dose) subcutaneous solution: 0.25 milligram(s) subcutaneously once a week

## 2023-05-11 NOTE — DISCHARGE NOTE PROVIDER - NSDCFUSCHEDAPPT_GEN_ALL_CORE_FT
[facility]  [facility] [address]  Scheduled Appointment: 06/19/2023

## 2023-05-11 NOTE — DISCHARGE NOTE NURSING/CASE MANAGEMENT/SOCIAL WORK - NSDCPEFALRISK_GEN_ALL_CORE
For information on Fall & Injury Prevention, visit: [email or web address] OR  [email or web address] OR  https://www.cdc.gov/steadi/patient.html

## 2023-05-11 NOTE — DISCHARGE NOTE PROVIDER - CARE PROVIDER_API CALL
Douglas Polk)  Internal Medicine  [address]  [place] [address]  Phone: [phone]  Fax: [phone]  Follow Up Time:

## 2023-05-14 LAB
CULTURE RESULTS: SIGNIFICANT CHANGE UP
CULTURE RESULTS: SIGNIFICANT CHANGE UP
SPECIMEN SOURCE: SIGNIFICANT CHANGE UP
SPECIMEN SOURCE: SIGNIFICANT CHANGE UP
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
## 2023-05-15 DIAGNOSIS — L03.116 CELLULITIS OF LEFT LOWER LIMB: ICD-10-CM
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
## 2023-05-15 DIAGNOSIS — Z20.822 CONTACT WITH AND (SUSPECTED) EXPOSURE TO COVID-19: ICD-10-CM

## 2023-05-15 DIAGNOSIS — Z91.018 ALLERGY TO OTHER FOODS: ICD-10-CM

## 2023-05-15 DIAGNOSIS — Z88.8 ALLERGY STATUS TO OTHER DRUGS, MEDICAMENTS AND BIOLOGICAL SUBSTANCES: ICD-10-CM

## 2023-05-15 DIAGNOSIS — S80.862A INSECT BITE (NONVENOMOUS), LEFT LOWER LEG, INITIAL ENCOUNTER: ICD-10-CM

## 2023-05-15 DIAGNOSIS — D72.829 ELEVATED WHITE BLOOD CELL COUNT, UNSPECIFIED: ICD-10-CM

## 2023-05-15 LAB — B BURGDOR DNA SPEC QL NAA+PROBE: NEGATIVE — SIGNIFICANT CHANGE UP

## 2023-06-19 ENCOUNTER — APPOINTMENT (OUTPATIENT)
Dept: DERMATOLOGY | Facility: CLINIC | Age: 43
End: 2023-06-19
Payer: COMMERCIAL

## 2023-06-19 ENCOUNTER — NON-APPOINTMENT (OUTPATIENT)
Age: 43
End: 2023-06-19

## 2023-06-19 DIAGNOSIS — Z12.83 ENCOUNTER FOR SCREENING FOR MALIGNANT NEOPLASM OF SKIN: ICD-10-CM

## 2023-06-19 PROBLEM — Z78.9 OTHER SPECIFIED HEALTH STATUS: Chronic | Status: ACTIVE | Noted: 2023-05-09

## 2023-06-19 PROCEDURE — 99204 OFFICE O/P NEW MOD 45 MIN: CPT

## 2023-06-19 RX ORDER — METRONIDAZOLE 7.5 MG/G
0.75 GEL TOPICAL TWICE DAILY
Qty: 1 | Refills: 3 | Status: ACTIVE | COMMUNITY
Start: 2023-06-19 | End: 1900-01-01

## 2023-06-19 NOTE — PHYSICAL EXAM
[FreeTextEntry3] : AAOx3, pleasant, NAD, no visual lymphadenopathy\par hair, scalp, face, nose, eyelids, ears, lips, oropharynx, neck, chest, abdomen, back, right arm, left arm, nails, and hands examined with all normal findings,\par pertinent findings include:\par \par multiple benign nevi and lentigines\par Erythema of central face with papules, pustules and telangiectasiae.\par

## 2023-06-19 NOTE — ASSESSMENT
[FreeTextEntry1] : 1) benign findings as above- education\par \par 2) rosacea; chronic, flaring\par education\par metrogel daily 1-2x a day; SED

## 2023-06-19 NOTE — HISTORY OF PRESENT ILLNESS
[FreeTextEntry1] : skin check [de-identified] : 43 year old male here for skin check. also redness on face, flaring.

## 2024-06-25 ENCOUNTER — APPOINTMENT (OUTPATIENT)
Dept: DERMATOLOGY | Facility: CLINIC | Age: 44
End: 2024-06-25
Payer: COMMERCIAL

## 2024-06-25 DIAGNOSIS — D22.9 MELANOCYTIC NEVI, UNSPECIFIED: ICD-10-CM

## 2024-06-25 DIAGNOSIS — L81.4 OTHER MELANIN HYPERPIGMENTATION: ICD-10-CM

## 2024-06-25 DIAGNOSIS — L71.9 ROSACEA, UNSPECIFIED: ICD-10-CM

## 2024-06-25 PROCEDURE — 99213 OFFICE O/P EST LOW 20 MIN: CPT

## 2024-09-26 NOTE — ED ADULT TRIAGE NOTE - PRO INTERPRETER NEED 2
[FreeTextEntry1] : Assessment: Ingrown toenail, left hallux lateral aspect.  Plan:   I performed straightback procedures utilizing a 12.7 cm sterile box lock, double spring fine cut back D tip fine tip stainless steel nail splitter removing the medial and lateral borders of both the left and right hallux nails as far back as tolerable and applied Amerigel wound gel with sterile compression dressings. The patient was instructed to start soaking the feet in lukewarm water and Epsom salts, 2 tablespoons per pint for 20 minutes twice per day.  The patient was advised to dry the feet with a clean towel and then apply a sterile dressing to the area for the next 5 days.  I advised the patient to notify the office right away if increased redness, swelling, pain, open wounds or discharge were observed.  PTR:  6-8 weeks or as needed.  English

## 2024-12-11 ENCOUNTER — APPOINTMENT (OUTPATIENT)
Dept: DERMATOLOGY | Facility: CLINIC | Age: 44
End: 2024-12-11

## 2025-06-24 ENCOUNTER — APPOINTMENT (OUTPATIENT)
Dept: DERMATOLOGY | Facility: CLINIC | Age: 45
End: 2025-06-24